# Patient Record
Sex: FEMALE | Race: WHITE | HISPANIC OR LATINO | Employment: FULL TIME | URBAN - METROPOLITAN AREA
[De-identification: names, ages, dates, MRNs, and addresses within clinical notes are randomized per-mention and may not be internally consistent; named-entity substitution may affect disease eponyms.]

---

## 2024-08-10 ENCOUNTER — HOSPITAL ENCOUNTER (EMERGENCY)
Facility: HOSPITAL | Age: 24
Discharge: HOME | End: 2024-08-11
Attending: STUDENT IN AN ORGANIZED HEALTH CARE EDUCATION/TRAINING PROGRAM
Payer: COMMERCIAL

## 2024-08-10 DIAGNOSIS — S81.812A LACERATION OF LEFT LOWER EXTREMITY, INITIAL ENCOUNTER: Primary | ICD-10-CM

## 2024-08-10 PROCEDURE — 99283 EMERGENCY DEPT VISIT LOW MDM: CPT | Performed by: STUDENT IN AN ORGANIZED HEALTH CARE EDUCATION/TRAINING PROGRAM

## 2024-08-10 PROCEDURE — 12002 RPR S/N/AX/GEN/TRNK2.6-7.5CM: CPT | Performed by: STUDENT IN AN ORGANIZED HEALTH CARE EDUCATION/TRAINING PROGRAM

## 2024-08-10 ASSESSMENT — PAIN DESCRIPTION - ORIENTATION: ORIENTATION: LEFT;LOWER

## 2024-08-10 ASSESSMENT — PAIN DESCRIPTION - DESCRIPTORS: DESCRIPTORS: DULL;SORE

## 2024-08-10 ASSESSMENT — PAIN DESCRIPTION - FREQUENCY: FREQUENCY: CONSTANT/CONTINUOUS

## 2024-08-10 ASSESSMENT — PAIN DESCRIPTION - ONSET: ONSET: SUDDEN

## 2024-08-10 ASSESSMENT — PAIN DESCRIPTION - PAIN TYPE: TYPE: ACUTE PAIN

## 2024-08-10 ASSESSMENT — COLUMBIA-SUICIDE SEVERITY RATING SCALE - C-SSRS
2. HAVE YOU ACTUALLY HAD ANY THOUGHTS OF KILLING YOURSELF?: NO
1. IN THE PAST MONTH, HAVE YOU WISHED YOU WERE DEAD OR WISHED YOU COULD GO TO SLEEP AND NOT WAKE UP?: NO
6. HAVE YOU EVER DONE ANYTHING, STARTED TO DO ANYTHING, OR PREPARED TO DO ANYTHING TO END YOUR LIFE?: NO

## 2024-08-10 ASSESSMENT — PAIN - FUNCTIONAL ASSESSMENT: PAIN_FUNCTIONAL_ASSESSMENT: 0-10

## 2024-08-10 ASSESSMENT — PAIN DESCRIPTION - LOCATION: LOCATION: LEG

## 2024-08-10 ASSESSMENT — PAIN SCALES - GENERAL: PAINLEVEL_OUTOF10: 2

## 2024-08-11 VITALS
HEIGHT: 69 IN | OXYGEN SATURATION: 98 % | BODY MASS INDEX: 26.66 KG/M2 | DIASTOLIC BLOOD PRESSURE: 86 MMHG | SYSTOLIC BLOOD PRESSURE: 115 MMHG | HEART RATE: 84 BPM | WEIGHT: 180 LBS | TEMPERATURE: 97 F | RESPIRATION RATE: 18 BRPM

## 2024-08-11 PROCEDURE — 90471 IMMUNIZATION ADMIN: CPT | Performed by: STUDENT IN AN ORGANIZED HEALTH CARE EDUCATION/TRAINING PROGRAM

## 2024-08-11 PROCEDURE — 90715 TDAP VACCINE 7 YRS/> IM: CPT | Performed by: STUDENT IN AN ORGANIZED HEALTH CARE EDUCATION/TRAINING PROGRAM

## 2024-08-11 PROCEDURE — 2500000004 HC RX 250 GENERAL PHARMACY W/ HCPCS (ALT 636 FOR OP/ED): Performed by: STUDENT IN AN ORGANIZED HEALTH CARE EDUCATION/TRAINING PROGRAM

## 2024-08-11 RX ADMIN — TETANUS TOXOID, REDUCED DIPHTHERIA TOXOID AND ACELLULAR PERTUSSIS VACCINE, ADSORBED 0.5 ML: 5; 2.5; 8; 8; 2.5 SUSPENSION INTRAMUSCULAR at 00:30

## 2024-08-11 NOTE — DISCHARGE INSTRUCTIONS
Sutures need to be taken out in 5 to 7 days.  Return if area becomes red hot or swollen. Tetanus was updated today

## 2024-08-11 NOTE — ED PROVIDER NOTES
CC: Leg Injury (Pt was walking and fell between the yard and dock )     HPI:  Patient is a 24-year-old female presents the emergency department after falling into a ditch.  She was walking and did not realize that there was a hole in the ground when she slipped and scraped her left shin.  She states there was a lot of bleeding which made her concerned.  She is adamant that she did not hit her head.  Her brother is at bedside who witnessed the event and states she did not hit her head.  She not lose conscious.  She has no midline CT or L spinal tenderness.  However she did have some drinks tonight.  She does not appear grossly intoxicated on my assessment.    Records Reviewed:  Recent available ED and inpatient notes reviewed in EMR.    PMHx/PSHx:  Per HPI.   - has no past medical history on file.  - has no past surgical history on file.  - does not have a problem list on file.    Medications:  Reviewed in EMR. See EMR for complete list of medications and doses.    Allergies:  Patient has no allergy information on record.    Social History:  - Tobacco:  has no history on file for tobacco use.   - Alcohol:  has no history on file for alcohol use.   - Illicit Drugs:  has no history on file for drug use.     ROS:  Per HPI.       ???????????????????????????????????????????????????????????????  Triage Vitals:  T 36.5 °C (97.7 °F)  HR 97  /78  RR 18  O2 97 % None (Room air)    Physical Exam  ???????????????????????????????????????????????????????????????  GEN: no acute distress  HEAD: atraumatic  EYES: PERRL, EOMI, no scleral icterus  NECK: no C-spine tenderness to palpation  CVS/CHEST: reg rate, nl rhythm  PULM: CTA b/l no wheezes, crackles, or rhonchi   GI: soft, NT/ND, no rebound or guarding   BACK: no vertebral point tenderness  EXT: No bony tenderness to palpation but does have a triangular-shaped laceration to the left anterior lower leg with mild bleeding 2+ periph pulses in bilat radial and DP   NEURO:  Awake and alert, Strength and sensation is equal in b/l upper and lower extremities, normal ambulation, no focal sensory deficits  SKIN: warm, dry  PSYCH: AAOx3 answers questions appropriately    Assessment and Plan:  24-year-old presents the emergency department for laceration to her left lower leg.  Tetanus updated because she is unsure when her last tetanus was.  Laceration repaired at bedside.  She has no bony tenderness.  Low suspicion for the need of x-ray at this time.  Patient did have some drinks but adamant that she did not hit her head and her brother at bedside agrees.  Patient awake and alert and answering questions appropriately.  Did offer imaging as this could potentially be a distracting injury however patient declined.  Patient does appear to have the ability to make medical decisions.  She does not appear grossly intoxicated.  Laceration repaired with 4 sutures and patient encouraged to have sutures removed in 5 to 7 days with strict return precautions.  Becomes red hot or swollen.  Patient and family at bedside expressed understanding and agreeable to plan.    ED Course:  Diagnoses as of 08/11/24 0751   Laceration of left lower extremity, initial encounter       Social Determinants Limiting Care:  None identified    Disposition:  Discharged in stable condition with return precautions    Kelle Victor DO      Laceration Repair    Performed by: Kelle Victor DO  Authorized by: Kelle Victor DO    Consent:     Consent obtained:  Verbal    Consent given by:  Patient    Risks discussed:  Infection, pain and poor cosmetic result    Alternatives discussed:  No treatment  Universal protocol:     Patient identity confirmed:  Verbally with patient  Anesthesia:     Anesthesia method:  Local infiltration    Local anesthetic:  Lidocaine 1% w/o epi  Laceration details:     Location:  Leg    Leg location:  L lower leg    Length (cm):  4  Pre-procedure details:     Preparation:  Patient was prepped and  draped in usual sterile fashion  Exploration:     Limited defect created (wound extended): no      Contaminated: no    Treatment:     Area cleansed with:  Povidone-iodine    Amount of cleaning:  Standard    Irrigation solution:  Sterile saline    Irrigation volume:  1000cc    Irrigation method:  Pressure wash  Skin repair:     Repair method:  Sutures    Suture size:  5-0    Suture material:  Nylon    Suture technique:  Simple interrupted  Approximation:     Approximation:  Close  Repair type:     Repair type:  Simple  Post-procedure details:     Dressing:  Open (no dressing)    Procedure completion:  Tolerated   ? [employer] last updated 8/11/2024 7:51 AM        Kelle Victor, DO  08/11/24 0757